# Patient Record
Sex: FEMALE | Race: BLACK OR AFRICAN AMERICAN | Employment: OTHER | URBAN - METROPOLITAN AREA
[De-identification: names, ages, dates, MRNs, and addresses within clinical notes are randomized per-mention and may not be internally consistent; named-entity substitution may affect disease eponyms.]

---

## 2018-09-18 LAB
AVERAGE GLUCOSE: NORMAL
HBA1C MFR BLD: 5.7 %

## 2020-01-07 LAB
AVERAGE GLUCOSE: NORMAL
HBA1C MFR BLD: 5.5 %

## 2022-04-08 PROBLEM — E66.9 OBESITY (BMI 30-39.9): Status: ACTIVE | Noted: 2022-04-08

## 2022-04-08 PROBLEM — R60.0 BILATERAL LOWER EXTREMITY EDEMA: Status: ACTIVE | Noted: 2022-04-08

## 2022-04-08 PROBLEM — Z00.00 HEALTHCARE MAINTENANCE: Status: ACTIVE | Noted: 2022-04-08

## 2022-04-11 ENCOUNTER — HOSPITAL ENCOUNTER (OUTPATIENT)
Dept: GENERAL RADIOLOGY | Age: 57
Discharge: HOME | End: 2022-04-11
Payer: COMMERCIAL

## 2022-04-11 DIAGNOSIS — M79.645 BILATERAL THUMB PAIN: ICD-10-CM

## 2022-04-11 DIAGNOSIS — M79.644 BILATERAL THUMB PAIN: ICD-10-CM

## 2022-04-11 PROBLEM — R60.0 BILATERAL LOWER EXTREMITY EDEMA: Status: ACTIVE | Noted: 2022-04-08

## 2022-04-11 PROBLEM — E66.9 OBESITY (BMI 30-39.9): Status: ACTIVE | Noted: 2022-04-08

## 2022-04-11 PROBLEM — Z00.00 HEALTHCARE MAINTENANCE: Status: ACTIVE | Noted: 2022-04-08

## 2022-04-11 PROCEDURE — 73140 X-RAY EXAM OF FINGER(S): CPT

## 2022-04-12 NOTE — PROGRESS NOTES
MEGHANM on 4/12/22 @ 8:18 AM to inform pt that x-ray of both thumbs is completely normal - no arthritis noted and to return my call with any questions/concerns.

## 2022-05-08 PROBLEM — Z00.00 HEALTHCARE MAINTENANCE: Status: RESOLVED | Noted: 2022-04-08 | Resolved: 2022-05-08

## 2022-05-13 ENCOUNTER — HOSPITAL ENCOUNTER (OUTPATIENT)
Dept: MAMMOGRAPHY | Age: 57
Discharge: HOME | End: 2022-05-13
Attending: PHYSICIAN ASSISTANT
Payer: COMMERCIAL

## 2022-05-13 DIAGNOSIS — Z12.31 ENCOUNTER FOR SCREENING MAMMOGRAM FOR BREAST CANCER: ICD-10-CM

## 2022-05-13 PROCEDURE — 77067 SCR MAMMO BI INCL CAD: CPT

## 2022-06-03 ENCOUNTER — HOSPITAL ENCOUNTER (OUTPATIENT)
Dept: MAMMOGRAPHY | Age: 57
Discharge: HOME | End: 2022-06-06
Payer: COMMERCIAL

## 2022-06-03 ENCOUNTER — APPOINTMENT (OUTPATIENT)
Dept: MAMMOGRAPHY | Age: 57
End: 2022-06-03
Payer: COMMERCIAL

## 2022-06-03 DIAGNOSIS — R92.8 ABNORMAL SCREENING MAMMOGRAM: ICD-10-CM

## 2022-06-03 PROCEDURE — 76642 ULTRASOUND BREAST LIMITED: CPT

## 2022-06-03 PROCEDURE — 77066 DX MAMMO INCL CAD BI: CPT

## 2022-06-06 NOTE — PROGRESS NOTES
I am just seeing this original mammogram report since it went to the old system. This was a new baseline since old imaging was not able to be reviewed. There were bilateral asymmetries which required further evaluation but I believe she has already had these done and I've reviewed the results.

## 2022-06-13 DIAGNOSIS — Z12.4 SCREENING FOR CERVICAL CANCER: Primary | ICD-10-CM

## 2022-06-13 NOTE — PROGRESS NOTES
Ordered by request of patient who desires a gynecologist to take care of pap smear which is overdue.

## 2022-08-12 DIAGNOSIS — E78.5 DYSLIPIDEMIA: ICD-10-CM

## 2022-08-12 DIAGNOSIS — I10 PRIMARY HYPERTENSION: Primary | ICD-10-CM

## 2022-08-24 DIAGNOSIS — E78.5 DYSLIPIDEMIA: ICD-10-CM

## 2022-08-24 DIAGNOSIS — I10 PRIMARY HYPERTENSION: ICD-10-CM

## 2022-08-25 LAB
ALBUMIN SERPL-MCNC: 3.5 G/DL (ref 3.5–5)
ALBUMIN/GLOB SERPL: 0.8 {RATIO} (ref 1.2–3.5)
ALP SERPL-CCNC: 89 U/L (ref 50–136)
ALT SERPL-CCNC: 16 U/L (ref 12–65)
ANION GAP SERPL CALC-SCNC: 8 MMOL/L (ref 7–16)
AST SERPL-CCNC: 20 U/L (ref 15–37)
BILIRUB SERPL-MCNC: 0.5 MG/DL (ref 0.2–1.1)
BUN SERPL-MCNC: 8 MG/DL (ref 6–23)
CALCIUM SERPL-MCNC: 10 MG/DL (ref 8.3–10.4)
CHLORIDE SERPL-SCNC: 111 MMOL/L (ref 98–107)
CHOLEST SERPL-MCNC: 184 MG/DL
CO2 SERPL-SCNC: 19 MMOL/L (ref 21–32)
CREAT SERPL-MCNC: 0.9 MG/DL (ref 0.6–1)
GLOBULIN SER CALC-MCNC: 4.4 G/DL (ref 2.3–3.5)
GLUCOSE SERPL-MCNC: 80 MG/DL (ref 65–100)
HDLC SERPL-MCNC: 53 MG/DL (ref 40–60)
HDLC SERPL: 3.5 {RATIO}
LDLC SERPL CALC-MCNC: 116.8 MG/DL
POTASSIUM SERPL-SCNC: 3.9 MMOL/L (ref 3.5–5.1)
PROT SERPL-MCNC: 7.9 G/DL (ref 6.3–8.2)
SODIUM SERPL-SCNC: 138 MMOL/L (ref 136–145)
TRIGL SERPL-MCNC: 71 MG/DL (ref 35–150)
VLDLC SERPL CALC-MCNC: 14.2 MG/DL (ref 6–23)

## 2022-08-26 ENCOUNTER — HOSPITAL ENCOUNTER (EMERGENCY)
Dept: CT IMAGING | Age: 57
Discharge: HOME | End: 2022-08-29
Payer: COMMERCIAL

## 2022-08-26 ENCOUNTER — HOSPITAL ENCOUNTER (EMERGENCY)
Dept: GENERAL RADIOLOGY | Age: 57
Discharge: HOME | End: 2022-08-29
Payer: COMMERCIAL

## 2022-08-26 ENCOUNTER — HOSPITAL ENCOUNTER (EMERGENCY)
Age: 57
Discharge: HOME | End: 2022-08-26
Attending: EMERGENCY MEDICINE
Payer: COMMERCIAL

## 2022-08-26 VITALS
SYSTOLIC BLOOD PRESSURE: 132 MMHG | BODY MASS INDEX: 34.99 KG/M2 | WEIGHT: 210 LBS | TEMPERATURE: 98.9 F | HEART RATE: 82 BPM | OXYGEN SATURATION: 99 % | DIASTOLIC BLOOD PRESSURE: 70 MMHG | RESPIRATION RATE: 18 BRPM | HEIGHT: 65 IN

## 2022-08-26 DIAGNOSIS — R10.84 GENERALIZED ABDOMINAL PAIN: Primary | ICD-10-CM

## 2022-08-26 DIAGNOSIS — R11.0 NAUSEA: ICD-10-CM

## 2022-08-26 LAB
ALBUMIN SERPL-MCNC: 3.4 G/DL (ref 3.5–5)
ALBUMIN/GLOB SERPL: 0.7 {RATIO} (ref 1.2–3.5)
ALP SERPL-CCNC: 99 U/L (ref 50–136)
ALT SERPL-CCNC: 12 U/L (ref 12–65)
ANION GAP SERPL CALC-SCNC: 6 MMOL/L (ref 7–16)
APPEARANCE UR: CLEAR
AST SERPL-CCNC: 20 U/L (ref 15–37)
BACTERIA URNS QL MICRO: NEGATIVE /HPF
BASOPHILS # BLD: 0.1 K/UL (ref 0–0.2)
BASOPHILS NFR BLD: 1 % (ref 0–2)
BILIRUB SERPL-MCNC: 0.6 MG/DL (ref 0.2–1.1)
BILIRUB UR QL: NEGATIVE
BUN SERPL-MCNC: 8 MG/DL (ref 6–23)
CALCIUM SERPL-MCNC: 9.4 MG/DL (ref 8.3–10.4)
CASTS URNS QL MICRO: ABNORMAL /LPF
CHLORIDE SERPL-SCNC: 111 MMOL/L (ref 98–107)
CO2 SERPL-SCNC: 25 MMOL/L (ref 21–32)
COLOR UR: ABNORMAL
CREAT SERPL-MCNC: 0.75 MG/DL (ref 0.6–1)
DIFFERENTIAL METHOD BLD: ABNORMAL
EKG ATRIAL RATE: 72 BPM
EKG DIAGNOSIS: NORMAL
EKG P AXIS: 44 DEGREES
EKG P-R INTERVAL: 180 MS
EKG Q-T INTERVAL: 376 MS
EKG QRS DURATION: 84 MS
EKG QTC CALCULATION (BAZETT): 411 MS
EKG R AXIS: 61 DEGREES
EKG T AXIS: 34 DEGREES
EKG VENTRICULAR RATE: 72 BPM
EOSINOPHIL # BLD: 0.1 K/UL (ref 0–0.8)
EOSINOPHIL NFR BLD: 1 % (ref 0.5–7.8)
EPI CELLS #/AREA URNS HPF: ABNORMAL /HPF
ERYTHROCYTE [DISTWIDTH] IN BLOOD BY AUTOMATED COUNT: 13.9 % (ref 11.9–14.6)
GLOBULIN SER CALC-MCNC: 4.6 G/DL (ref 2.3–3.5)
GLUCOSE SERPL-MCNC: 96 MG/DL (ref 65–100)
GLUCOSE UR STRIP.AUTO-MCNC: NEGATIVE MG/DL
HCT VFR BLD AUTO: 39.9 % (ref 35.8–46.3)
HGB BLD-MCNC: 12.4 G/DL (ref 11.7–15.4)
HGB UR QL STRIP: NEGATIVE
IMM GRANULOCYTES # BLD AUTO: 0 K/UL (ref 0–0.5)
IMM GRANULOCYTES NFR BLD AUTO: 0 % (ref 0–5)
KETONES UR QL STRIP.AUTO: NEGATIVE MG/DL
LEUKOCYTE ESTERASE UR QL STRIP.AUTO: ABNORMAL
LIPASE SERPL-CCNC: 85 U/L (ref 73–393)
LYMPHOCYTES # BLD: 3.5 K/UL (ref 0.5–4.6)
LYMPHOCYTES NFR BLD: 38 % (ref 13–44)
MAGNESIUM SERPL-MCNC: 2.3 MG/DL (ref 1.8–2.4)
MCH RBC QN AUTO: 29 PG (ref 26.1–32.9)
MCHC RBC AUTO-ENTMCNC: 31.1 G/DL (ref 31.4–35)
MCV RBC AUTO: 93.4 FL (ref 79.6–97.8)
MONOCYTES # BLD: 0.8 K/UL (ref 0.1–1.3)
MONOCYTES NFR BLD: 9 % (ref 4–12)
NEUTS SEG # BLD: 4.6 K/UL (ref 1.7–8.2)
NEUTS SEG NFR BLD: 51 % (ref 43–78)
NITRITE UR QL STRIP.AUTO: NEGATIVE
NRBC # BLD: 0 K/UL (ref 0–0.2)
NT PRO BNP: 31 PG/ML (ref 5–125)
PH UR STRIP: 5.5 [PH] (ref 5–9)
PLATELET # BLD AUTO: 312 K/UL (ref 150–450)
PMV BLD AUTO: 10.2 FL (ref 9.4–12.3)
POTASSIUM SERPL-SCNC: 4 MMOL/L (ref 3.5–5.1)
PROT SERPL-MCNC: 8 G/DL (ref 6.3–8.2)
PROT UR STRIP-MCNC: NEGATIVE MG/DL
RBC # BLD AUTO: 4.27 M/UL (ref 4.05–5.2)
RBC #/AREA URNS HPF: ABNORMAL /HPF
SODIUM SERPL-SCNC: 142 MMOL/L (ref 136–145)
SP GR UR REFRACTOMETRY: 1.01 (ref 1–1.02)
TROPONIN I SERPL HS-MCNC: 4.4 PG/ML (ref 0–14)
UROBILINOGEN UR QL STRIP.AUTO: 1 EU/DL (ref 0.2–1)
WBC # BLD AUTO: 9 K/UL (ref 4.3–11.1)
WBC URNS QL MICRO: ABNORMAL /HPF

## 2022-08-26 PROCEDURE — 93005 ELECTROCARDIOGRAM TRACING: CPT | Performed by: EMERGENCY MEDICINE

## 2022-08-26 PROCEDURE — 85025 COMPLETE CBC W/AUTO DIFF WBC: CPT

## 2022-08-26 PROCEDURE — 80053 COMPREHEN METABOLIC PANEL: CPT

## 2022-08-26 PROCEDURE — 99285 EMERGENCY DEPT VISIT HI MDM: CPT

## 2022-08-26 PROCEDURE — 84484 ASSAY OF TROPONIN QUANT: CPT

## 2022-08-26 PROCEDURE — 83690 ASSAY OF LIPASE: CPT

## 2022-08-26 PROCEDURE — 81001 URINALYSIS AUTO W/SCOPE: CPT

## 2022-08-26 PROCEDURE — 83880 ASSAY OF NATRIURETIC PEPTIDE: CPT

## 2022-08-26 PROCEDURE — 71045 X-RAY EXAM CHEST 1 VIEW: CPT

## 2022-08-26 PROCEDURE — 74177 CT ABD & PELVIS W/CONTRAST: CPT

## 2022-08-26 PROCEDURE — 83735 ASSAY OF MAGNESIUM: CPT

## 2022-08-26 PROCEDURE — 6360000004 HC RX CONTRAST MEDICATION: Performed by: EMERGENCY MEDICINE

## 2022-08-26 RX ORDER — ONDANSETRON 4 MG/1
4 TABLET, FILM COATED ORAL 3 TIMES DAILY PRN
Qty: 15 TABLET | Refills: 0 | Status: SHIPPED | OUTPATIENT
Start: 2022-08-26

## 2022-08-26 RX ORDER — ATORVASTATIN CALCIUM 40 MG/1
40 TABLET, FILM COATED ORAL DAILY
COMMUNITY
Start: 2022-04-14

## 2022-08-26 RX ORDER — HYOSCYAMINE SULFATE 0.12 MG/1
0.12 TABLET SUBLINGUAL EVERY 6 HOURS PRN
Qty: 20 EACH | Refills: 0 | Status: SHIPPED | OUTPATIENT
Start: 2022-08-26

## 2022-08-26 RX ORDER — CEPHALEXIN 500 MG/1
500 CAPSULE ORAL 3 TIMES DAILY
Qty: 21 CAPSULE | Refills: 0 | Status: SHIPPED | OUTPATIENT
Start: 2022-08-26 | End: 2022-09-02

## 2022-08-26 RX ORDER — HYDROCHLOROTHIAZIDE 25 MG/1
25 TABLET ORAL DAILY
COMMUNITY
Start: 2022-04-11

## 2022-08-26 RX ORDER — ONDANSETRON 2 MG/ML
4 INJECTION INTRAMUSCULAR; INTRAVENOUS ONCE
Status: DISCONTINUED | OUTPATIENT
Start: 2022-08-26 | End: 2022-08-26 | Stop reason: HOSPADM

## 2022-08-26 RX ADMIN — IOPAMIDOL 100 ML: 755 INJECTION, SOLUTION INTRAVENOUS at 16:04

## 2022-08-26 NOTE — ED NOTES
Assumed care of pt. After report from  [facility].      Luz Elena Covarrubias RN  08/26/22 2176

## 2022-08-26 NOTE — ED TRIAGE NOTES
Pt states that for the past month she's been feeling chest pressure, generalized abdominal pain, and nausea. Pt denies vomiting.

## 2022-08-26 NOTE — PROGRESS NOTES
I taken over the patient at 1600 pending CT scan the patient's abdomen. I went interviewed and examined the patient. She states that she has been having some generalized abdominal pain as well as some lower abdominal pain. Lab work here was reviewed showed no elevation white blood cell count, CMP here is felt unremarkable with normal lipase and liver and bilirubin. Urinalysis did show some slight leukocyte esterase. This time the patient be given Levsin and Zofran as well as Keflex at home. She will be given outpatient surgeon follow-up. Patient was given return precautions. Patient stable and discharged abdominal examination.

## 2022-08-26 NOTE — ED NOTES
I have reviewed discharge instructions with the patient. The patient verbalized understanding. Patient left ED via Discharge Method: ambulatory to Home with Self). Opportunity for questions and clarification provided. Patient given 3 scripts. To continue your aftercare when you leave the hospital, you may receive an automated call from our care team to check in on how you are doing. This is a free service and part of our promise to provide the best care and service to meet your aftercare needs.  If you have questions, or wish to unsubscribe from this service please call [phone]. Thank you for Choosing our [facility] Emergency Department.       Nancy Ndiaye RN  08/26/22 4743

## 2022-08-26 NOTE — ED PROVIDER NOTES
[employer] Emergency Department Provider Note                   PCP:                Kierra Borges PA-C               Age: 65 y.o. Sex: female     No diagnosis found. DISPOSITION         MDM  Number of Diagnoses or Management Options  Diagnosis management comments: Patient labs have been normal she has been in no distress while in the ED. She is still waiting on her CT scan which have called several times about to make sure gets expedited. Unfortunately my shift is over and I had to sign patient out to oncoming ED doctor. She will be disposed after this results. Xiang Lezama MD; 8/26/2022 @4:03 PM Voice dictation software was used during the making of this note. This software is not perfect and grammatical and other typographical errors may be present. This note has not been proofread for errors.  ====================================          Amount and/or Complexity of Data Reviewed  Clinical lab tests: ordered and reviewed  Tests in the radiology section of CPT®: ordered and reviewed         Orders Placed This Encounter   Procedures    XR CHEST PORTABLE    CT ABDOMEN PELVIS W IV CONTRAST Additional Contrast? None    CBC with Auto Differential    Comprehensive Metabolic Panel    Magnesium    Lipase    Brain Natriuretic Peptide    Troponin    Cardiac Monitor - ED Only    Continuous Pulse Oximetry    EKG 12 Lead    Saline lock IV        Medications - No data to display    New Prescriptions    No medications on file        Ricardo Shah is a 65 y.o. female who presents to the Emergency Department with chief complaint of    Chief Complaint   Patient presents with    Abdominal Pain      Patient has had some off-and-on abdominal pain for the last month. She reports that over the last day she has developed more severe abdominal pain mostly along the lower abdomen but there is some underneath both ribs bilaterally and some pain in the lower back.   She denies any history of past surgeries on the abdomen. No fevers or chills. She has no vomiting. But does report nausea. She did report some pressure radiating up into her chest and reports that she feels swollen from fluids all over her body. States she does take hydrochlorothiazide but forgot to take it this morning. All other systems reviewed and are negative unless otherwise stated in the History of Present Illness section. Past Medical History:   Diagnosis Date    Asthma     Edema     Environmental allergies     Positive for Dust Mites, Molds, Grass & Tree Pollens    Hypercholesterolemia     Hypertension     Injury of left rotator cuff     Menopause     Osteoarthritis     Knees    Prediabetes         Past Surgical History:   Procedure Laterality Date    BREAST BIOPSY Left     Benign    COLONOSCOPY  11/05/2018    Hyperplastic Polyps - Due 2028        Family History   Problem Relation Age of Onset    Diabetes Father     Hypertension Father     Stroke Maternal Grandfather     No Known Problems Brother     Alzheimer's Disease Maternal Grandmother     Diabetes Mother     Hypertension Mother     Alzheimer's Disease Mother         Social History     Socioeconomic History    Marital status: Legally      Spouse name: None    Number of children: None    Years of education: None    Highest education level: None   Tobacco Use    Smoking status: Never    Smokeless tobacco: Never   Substance and Sexual Activity    Alcohol use: Never    Drug use: Never        Allergies: Benzalkonium chloride and Sulfa antibiotics    Previous Medications    ATORVASTATIN (LIPITOR) 40 MG TABLET    Take 40 mg by mouth daily    CYANOCOBALAMIN 1000 MCG TABLET    Take 1,000 mcg by mouth daily    HYDROCHLOROTHIAZIDE (HYDRODIURIL) 25 MG TABLET    Take 25 mg by mouth daily        Vitals signs and nursing note reviewed.    ED Triage Vitals [08/26/22 1241]   Enc Vitals Group      /82      Heart Rate 99      Resp 18      Temp 98.9 °F (37.2 °C)      Temp Source Oral SpO2 99 %      Weight 210 lb (95.3 kg)      Height 5' 4.5\" (1.638 m)      Head Circumference       Peak Flow       Pain Score       Pain Loc       Pain Edu? Excl. in [address]? Physical Exam  Vitals and nursing note reviewed. Constitutional:       General: She is not in acute distress. Appearance: She is well-developed. She is not ill-appearing, toxic-appearing or diaphoretic. HENT:      Head: Normocephalic and atraumatic. Eyes:      General: No scleral icterus. Conjunctiva/sclera: Conjunctivae normal.   Cardiovascular:      Rate and Rhythm: Normal rate and regular rhythm. Pulmonary:      Effort: Pulmonary effort is normal. No respiratory distress. Breath sounds: No stridor. No wheezing, rhonchi or rales. Abdominal:      General: Bowel sounds are normal.      Palpations: Abdomen is soft. There is no shifting dullness or fluid wave. Tenderness: There is generalized abdominal tenderness and tenderness in the right upper quadrant, right lower quadrant, left upper quadrant and left lower quadrant. There is no right CVA tenderness, left CVA tenderness, guarding or rebound. Hernia: No hernia is present. Musculoskeletal:      Cervical back: Normal range of motion and neck supple. Skin:     Capillary Refill: Capillary refill takes less than 2 seconds. Neurological:      General: No focal deficit present. Mental Status: She is alert and oriented to person, place, and time. Mental status is at baseline. Psychiatric:         Mood and Affect: Mood normal.         Behavior: Behavior normal.        Procedures    Labs Reviewed   CBC WITH AUTO DIFFERENTIAL   COMPREHENSIVE METABOLIC PANEL   MAGNESIUM   LIPASE   BRAIN NATRIURETIC PEPTIDE   TROPONIN        XR CHEST PORTABLE    (Results Pending)   CT ABDOMEN PELVIS W IV CONTRAST Additional Contrast? None    (Results Pending)                            Voice dictation software was used during the making of this note.   This software is not perfect and grammatical and other typographical errors may be present. This note has not been completely proofread for errors.      Samia Escudero MD  08/26/22 9430

## 2022-08-26 NOTE — DISCHARGE INSTRUCTIONS
You are diagnosed with unspecified abdominal pain here as well as urinary tract infection. There is evidence of gallstones on your CAT scan. Will be follow-up with the surgeon for continued evaluation of your gallstones. We have written you for 3 medications at home. These were sent to the [facility] on [facility]. Please return the emergency room if any worsening symptoms.

## 2022-08-29 ENCOUNTER — NURSE ONLY (OUTPATIENT)
Dept: INTERNAL MEDICINE CLINIC | Facility: CLINIC | Age: 57
End: 2022-08-29

## 2022-08-29 ENCOUNTER — OFFICE VISIT (OUTPATIENT)
Dept: INTERNAL MEDICINE CLINIC | Facility: CLINIC | Age: 57
End: 2022-08-29
Payer: COMMERCIAL

## 2022-08-29 VITALS
HEART RATE: 75 BPM | HEIGHT: 65 IN | TEMPERATURE: 97.9 F | BODY MASS INDEX: 35.16 KG/M2 | OXYGEN SATURATION: 98 % | WEIGHT: 211 LBS | SYSTOLIC BLOOD PRESSURE: 115 MMHG | DIASTOLIC BLOOD PRESSURE: 75 MMHG

## 2022-08-29 DIAGNOSIS — E78.5 DYSLIPIDEMIA: ICD-10-CM

## 2022-08-29 DIAGNOSIS — K59.00 CONSTIPATION, UNSPECIFIED CONSTIPATION TYPE: ICD-10-CM

## 2022-08-29 DIAGNOSIS — R74.8 ELEVATED ALKALINE PHOSPHATASE LEVEL: ICD-10-CM

## 2022-08-29 DIAGNOSIS — K64.9 HEMORRHOIDS, UNSPECIFIED HEMORRHOID TYPE: ICD-10-CM

## 2022-08-29 DIAGNOSIS — M53.3 SCLEROSIS OF SACROILIAC JOINT: Primary | ICD-10-CM

## 2022-08-29 DIAGNOSIS — R93.5 ABNORMAL CT SCAN, PELVIS: ICD-10-CM

## 2022-08-29 DIAGNOSIS — I10 PRIMARY HYPERTENSION: ICD-10-CM

## 2022-08-29 DIAGNOSIS — M53.3 SCLEROSIS OF SACROILIAC JOINT: ICD-10-CM

## 2022-08-29 DIAGNOSIS — R10.84 GENERALIZED ABDOMINAL PAIN: ICD-10-CM

## 2022-08-29 PROCEDURE — 99215 OFFICE O/P EST HI 40 MIN: CPT | Performed by: PHYSICIAN ASSISTANT

## 2022-08-29 RX ORDER — EVENING PRIMROSE OIL 500 MG
CAPSULE ORAL DAILY
COMMUNITY

## 2022-08-29 RX ORDER — DIAPER,BRIEF,INFANT-TODD,DISP
EACH MISCELLANEOUS
Qty: 30 G | Refills: 1 | Status: SHIPPED | OUTPATIENT
Start: 2022-08-29 | End: 2022-09-05

## 2022-08-29 ASSESSMENT — PATIENT HEALTH QUESTIONNAIRE - PHQ9
1. LITTLE INTEREST OR PLEASURE IN DOING THINGS: 0
2. FEELING DOWN, DEPRESSED OR HOPELESS: 0
SUM OF ALL RESPONSES TO PHQ QUESTIONS 1-9: 0
SUM OF ALL RESPONSES TO PHQ9 QUESTIONS 1 & 2: 0
SUM OF ALL RESPONSES TO PHQ QUESTIONS 1-9: 0

## 2022-08-29 ASSESSMENT — ENCOUNTER SYMPTOMS
VOMITING: 0
NAUSEA: 1
BLOOD IN STOOL: 0
BACK PAIN: 1
CONSTIPATION: 1
ABDOMINAL PAIN: 1
ABDOMINAL DISTENTION: 1
SHORTNESS OF BREATH: 1

## 2022-08-29 NOTE — PATIENT INSTRUCTIONS
Trial hold of Lipitor x 2 weeks  Emphasized the importance of staying well hydrated with plenty of water especially since she's back on a daily diuretic (Hydrochlorothiazide)  Reviewed findings on CT scan of pelvis and discussed further work-up to be necessary to evaluate further for possibility of Paget's disease or other source of bone abnormalities noted on CT  Reassured that pocket of fluid on front of R knee is likely a bursitis reaction to her fall on that knee  Offered x-ray of R ankle joint since she does have some discomfort there but she declined for now opting to focus on the other testing we have planned  Suggest following a low sodium diet to limit the retention of fluid by her body  Advised that she will need to get fasting labs drawn at any of the [address] [place] [address] lab locations (outlined on handout provided to patient) approx 1 week prior to scheduled follow-up appointment

## 2022-08-29 NOTE — PROGRESS NOTES
Ramón Robles (:  1965) is a 65 y.o. female,Established patient, here for evaluation of the following chief complaint(s):  Follow-up (Hypertension, Hyperlipidemia) and Abdominal Pain (Recent ER visit 22)         ASSESSMENT/PLAN:  1. Sclerosis of sacroiliac joint  -     Alkaline Phosphatase, Bone Specific; Future  -     Phosphorus; Future  -     NM BONE SCAN WHOLE BODY; Future  -     C Telopeptide; Future  -     Procollagen I Intact N-TErminal PROPEP; Future  -     [facility] Home   2. Hemorrhoids, unspecified hemorrhoid type  -     hydrocortisone (ALA-BRUCE) 1 % cream; Apply topically 2 times daily. , Disp-30 g, R-1, Normal  3. Dyslipidemia  -     Lipid Panel; Future  4. Primary hypertension  -     Comprehensive Metabolic Panel; Future  -     CBC with Auto Differential; Future  5. Elevated alkaline phosphatase level  6. Abnormal CT scan, pelvis  7. Constipation, unspecified constipation type  8. Generalized abdominal pain    No follow-ups on file. Subjective   SUBJECTIVE/OBJECTIVE:  The patient is a 65 y.o. female who is seen for evaluation of hyperlipidemia. She was tested because of dyslipidemia. The current state of this condition is improved and reasonably well controlled on Lipitor 40 mg q hs - taking as prescribed, adverse effects: constipation & abdominal pain.        Last Lipid Panel:   Lab Results   Component Value Date    CHOL 184 2022    CHOL 245 (H) 2022     Lab Results   Component Value Date    TRIG 71 2022    TRIG 62 2022     Lab Results   Component Value Date    HDL 53 2022    HDL 52 2022     Lab Results   Component Value Date    LDLCALC 116.8 (H) 2022    LDLCALC 183 (H) 2022     Lab Results   Component Value Date    LABVLDL 14.2 2022    VLDL 10 2022     Lab Results   Component Value Date    CHOLHDLRATIO 3.5 2022       Patient is here for ER follow-up of generalized abdominal pain w/ nausea. The current state of this condition is poorly controlled on Miralax sporadically - not taking daily as prescribed. She describes coming back from recent vacation earlier this month with severe constipation requiring manual disimpaction with intermittent abdominal pain x 1 month. She reports painful & irritated hemorrhoids as a result of the constipation. She admits to not drinking enough water - especially on recent vacation and has been back on Lipitor x 4 months which has historically caused constipation for her. Work up at the ER included extensive labs including CBC, CMP, magnesium, lipase, BNP, troponin, & urinalysis which were all within normal limits & CXR which was normal, & CT abdomen & pelvis which found \"diffuse sclerosis with cortical and trabecular thickening involving the R hemipelvis - favored to represent sequela of Paget's disease, and cholelithiasis without evidence of acute cholecystitis\". She does recall a history of mildly elevated alkaline phosphatase on previous labs - 127 in Jan 2020 & 130 in Feb 2020 per review of historical lab results.       Recent Results (from the past 336 hour(s))   Lipid Panel    Collection Time: 08/24/22  9:37 AM   Result Value Ref Range    Cholesterol, Total 184 <200 MG/DL    Triglycerides 71 35 - 150 MG/DL    HDL 53 40 - 60 MG/DL    LDL Calculated 116.8 (H) <100 MG/DL    VLDL Cholesterol Calculated 14.2 6.0 - 23.0 MG/DL    Chol/HDL Ratio 3.5     Comprehensive Metabolic Panel    Collection Time: 08/24/22  9:37 AM   Result Value Ref Range    Sodium 138 136 - 145 mmol/L    Potassium 3.9 3.5 - 5.1 mmol/L    Chloride 111 (H) 98 - 107 mmol/L    CO2 19 (L) 21 - 32 mmol/L    Anion Gap 8 7 - 16 mmol/L    Glucose 80 65 - 100 mg/dL    BUN 8 6 - 23 MG/DL    Creatinine 0.90 0.6 - 1.0 MG/DL    GFR African American >60 >60 ml/min/1.73m2    GFR Non- >60 >60 ml/min/1.73m2    Calcium 10.0 8.3 - 10.4 MG/DL    Total Bilirubin 0.5 0.2 - 1.1 MG/DL    ALT 16 12 - 65 U/L    AST 20 15 - 37 U/L    Alk Phosphatase 89 50 - 136 U/L    Total Protein 7.9 6.3 - 8.2 g/dL    Albumin 3.5 3.5 - 5.0 g/dL    Globulin 4.4 (H) 2.3 - 3.5 g/dL    Albumin/Globulin Ratio 0.8 (L) 1.2 - 3.5     CBC with Auto Differential    Collection Time: 08/26/22  1:39 PM   Result Value Ref Range    WBC 9.0 4.3 - 11.1 K/uL    RBC 4.27 4.05 - 5.2 M/uL    Hemoglobin 12.4 11.7 - 15.4 g/dL    Hematocrit 39.9 35.8 - 46.3 %    MCV 93.4 79.6 - 97.8 FL    MCH 29.0 26.1 - 32.9 PG    MCHC 31.1 (L) 31.4 - 35.0 g/dL    RDW 13.9 11.9 - 14.6 %    Platelets 771 073 - 728 K/uL    MPV 10.2 9.4 - 12.3 FL    nRBC 0.00 0.0 - 0.2 K/uL    Differential Type AUTOMATED      Seg Neutrophils 51 43 - 78 %    Lymphocytes 38 13 - 44 %    Monocytes 9 4.0 - 12.0 %    Eosinophils % 1 0.5 - 7.8 %    Basophils 1 0.0 - 2.0 %    Immature Granulocytes 0 0.0 - 5.0 %    Segs Absolute 4.6 1.7 - 8.2 K/UL    Absolute Lymph # 3.5 0.5 - 4.6 K/UL    Absolute Mono # 0.8 0.1 - 1.3 K/UL    Absolute Eos # 0.1 0.0 - 0.8 K/UL    Basophils Absolute 0.1 0.0 - 0.2 K/UL    Absolute Immature Granulocyte 0.0 0.0 - 0.5 K/UL   Comprehensive Metabolic Panel    Collection Time: 08/26/22  1:39 PM   Result Value Ref Range    Sodium 142 136 - 145 mmol/L    Potassium 4.0 3.5 - 5.1 mmol/L    Chloride 111 (H) 98 - 107 mmol/L    CO2 25 21 - 32 mmol/L    Anion Gap 6 (L) 7 - 16 mmol/L    Glucose 96 65 - 100 mg/dL    BUN 8 6 - 23 MG/DL    Creatinine 0.75 0.6 - 1.0 MG/DL    GFR African American >60 >60 ml/min/1.73m2    GFR Non- >60 >60 ml/min/1.73m2    Calcium 9.4 8.3 - 10.4 MG/DL    Total Bilirubin 0.6 0.2 - 1.1 MG/DL    ALT 12 12 - 65 U/L    AST 20 15 - 37 U/L    Alk Phosphatase 99 50 - 136 U/L    Total Protein 8.0 6.3 - 8.2 g/dL    Albumin 3.4 (L) 3.5 - 5.0 g/dL    Globulin 4.6 (H) 2.3 - 3.5 g/dL    Albumin/Globulin Ratio 0.7 (L) 1.2 - 3.5     Magnesium    Collection Time: 08/26/22  1:39 PM   Result Value Ref Range    Magnesium 2.3 1.8 - 2.4 patient size, iterative reconstruction. FINDINGS:  LUNG BASES: No airspace consolidation within the lung bases. No sizable pleural  effusion. The heart is not enlarged. LIVER: The liver contour is normal. No suspicious liver lesion. BILIARY TREE: Punctate calcified gallstone within the gallbladder. No evidence  of acute cholecystitis. No biliary dilation. SPLEEN: Normal.    PANCREAS: No pancreatic mass or ductal dilation. ADRENALS: Normal.    KIDNEYS/BLADDER: The kidneys are symmetric in size. No renal calculus or  hydronephrosis. Few subcentimeter bilateral renal cysts. No enhancing renal  masses evident. The urinary bladder is unremarkable. BOWEL: The colon is unremarkable. The small bowel is normal in caliber. No bowel  wall thickening. APPENDIX: The appendix is normal.    PERITONEUM/RETROPERITONEUM: No ascites or free air. No pelvic or retroperitoneal  lymphadenopathy. VESSELS: No abdominal aortic aneurysm. ABDOMINAL WALL: No hernia or mass. REPRODUCTIVE: The uterus is unremarkable. No adnexal mass. Sclerosis involving  the right hemipelvis with associated cortical and trabecular thickening. BONES: The remaining visualized osseous structures are within normal limits. Minimal degenerative changes of the lumbar spine. Impression  1. No acute abnormality within the abdomen and pelvis. 2.  Diffuse sclerosis with cortical and trabecular thickening involving the  right hemipelvis, favored to represent sequela of Paget's disease. 3.  Cholelithiasis without evidence of acute cholecystitis. Patient is here for follow-up of dizziness. The current state of this condition is asymptomatic and improved - not currently on medications for this problem. Home monitoring of O2 levels has been 96-97% but she denies any dizziness episodes since last visit so has not been able to evaluate oxygen levels DURING or immediately following an episode.       Review of Systems   Constitutional: Positive for appetite change (reduced x 1 year), fever (intermittent x 1 month) and unexpected weight change (30 pounds). Negative for chills and fatigue. Respiratory:  Positive for shortness of breath. Cardiovascular:  Positive for chest pain (described as pressure/fullness - not found to be cardiac at ER) and leg swelling (R leg). Negative for palpitations. Gastrointestinal:  Positive for abdominal distention, abdominal pain (consistent lower quadrants x 1 month), constipation (x 10 days) and nausea. Negative for blood in stool and vomiting. Positive for indigestion   Musculoskeletal:  Positive for back pain (intermittent - lumbar chronic) and myalgias (bilateral knees). Neurological:  Positive for headaches. Negative for dizziness. Objective   Physical Exam  Constitutional:       Appearance: Normal appearance. HENT:      Head: Normocephalic and atraumatic. Eyes:      Conjunctiva/sclera: Conjunctivae normal.      Pupils: Pupils are equal, round, and reactive to light. Neck:      Vascular: No carotid bruit. Cardiovascular:      Rate and Rhythm: Normal rate and regular rhythm. Heart sounds: Normal heart sounds. Pulmonary:      Effort: Pulmonary effort is normal.      Breath sounds: Normal breath sounds. Abdominal:      General: Bowel sounds are normal.      Palpations: Abdomen is soft. Tenderness: There is generalized abdominal tenderness. Musculoskeletal:         General: Normal range of motion. Cervical back: Normal range of motion. Right knee: Swelling (prepatellar) present. Right lower leg: Swelling present. No edema. Left lower leg: No swelling. No edema. Right ankle: Swelling present. Legs:       Comments: Scar from fall   Skin:     General: Skin is warm and dry. Neurological:      Mental Status: She is alert and oriented to person, place, and time.    Psychiatric:         Mood and Affect: Mood normal.         Behavior: Behavior normal.         Thought Content: Thought content normal.         Judgment: Judgment normal.          On this date 8/29/2022 I have spent 45 minutes reviewing previous notes, test results and face to face with the patient discussing the diagnosis and importance of compliance with the treatment plan as well as documenting on the day of the visit. An electronic signature was used to authenticate this note.     --Qi Dodd PA-C

## 2022-09-01 LAB — PHOSPHATE SERPL-MCNC: 3.8 MG/DL (ref 2.5–4.5)

## 2022-09-08 LAB — COLLAGEN CTX SERPL-MCNC: 551 PG/ML

## 2022-09-09 LAB
ALP BONE SERPL-MCNC: 32.7 UG/L
PINP SER-MCNC: 184 UG/L

## 2022-10-12 ENCOUNTER — APPOINTMENT (OUTPATIENT)
Dept: CT IMAGING | Age: 57
End: 2022-10-12
Payer: COMMERCIAL

## 2022-10-12 ENCOUNTER — HOSPITAL ENCOUNTER (EMERGENCY)
Age: 57
Discharge: HOME | End: 2022-10-12
Attending: EMERGENCY MEDICINE | Admitting: EMERGENCY MEDICINE
Payer: COMMERCIAL

## 2022-10-12 VITALS
RESPIRATION RATE: 18 BRPM | OXYGEN SATURATION: 99 % | HEART RATE: 82 BPM | DIASTOLIC BLOOD PRESSURE: 88 MMHG | TEMPERATURE: 98.8 F | SYSTOLIC BLOOD PRESSURE: 156 MMHG

## 2022-10-12 DIAGNOSIS — R11.0 NAUSEA: ICD-10-CM

## 2022-10-12 DIAGNOSIS — F07.81 CONCUSSION SYNDROME: ICD-10-CM

## 2022-10-12 DIAGNOSIS — S09.90XA INJURY OF HEAD, INITIAL ENCOUNTER: Primary | ICD-10-CM

## 2022-10-12 PROCEDURE — 70450 CT HEAD/BRAIN W/O DYE: CPT

## 2022-10-12 PROCEDURE — 99284 EMERGENCY DEPT VISIT MOD MDM: CPT | Performed by: EMERGENCY MEDICINE

## 2022-10-12 PROCEDURE — 72125 CT NECK SPINE W/O DYE: CPT

## 2022-10-12 RX ORDER — ONDANSETRON 4 MG/1
4 TABLET, FILM COATED ORAL 3 TIMES DAILY PRN
Qty: 15 TABLET | Refills: 2 | Status: SHIPPED | OUTPATIENT
Start: 2022-10-12

## 2022-10-12 ASSESSMENT — PAIN DESCRIPTION - LOCATION: LOCATION: HEAD

## 2022-10-12 ASSESSMENT — PAIN DESCRIPTION - ORIENTATION: ORIENTATION: POSTERIOR

## 2022-10-12 ASSESSMENT — ENCOUNTER SYMPTOMS: BACK PAIN: 0

## 2022-10-12 ASSESSMENT — PAIN - FUNCTIONAL ASSESSMENT: PAIN_FUNCTIONAL_ASSESSMENT: 0-10

## 2022-10-12 ASSESSMENT — PAIN SCALES - GENERAL: PAINLEVEL_OUTOF10: 7

## 2022-10-12 NOTE — ED TRIAGE NOTES
Pt ambulatory to triage. Pt states that she got up around 0300 last night to use the restroom and tripped and fell, hit her head on the tub. Pt did not have any LOC from this fall. Pt states a few hours ago she started to have a posterior headache along with some nausea and slight neck pain. Pt denies weakness or numbness/tingling on one side of the body, double vision, denies issues with speech, pt able to walk in a straight line with a normal gait. NIH assessed in triage, NIH 0 at this time. Pt denies blood thinners.

## 2022-10-12 NOTE — ED PROVIDER NOTES
[facility] Emergency Department Provider Note                   PCP:                Mariza Manuel PA-C               Age: 65 y.o. Sex: female       ICD-10-CM    1. Injury of head, initial encounter  S09.90XA       2. Nausea  R11.0       3. Concussion syndrome  F07.81           DISPOSITION Decision To Discharge 10/12/2022 03:04:37 PM         Orders Placed This Encounter   Procedures    CT HEAD WO CONTRAST    CT CERVICAL SPINE WO CONTRAST        Yoav Mcnair is a 65 y.o. female who presents to the Emergency Department with chief complaint of    Chief Complaint   Patient presents with    Fall      52-year-old female who had a fall overnight, early this morning. This was a mechanical fall. She tripped on the bathroom. She fell forward hitting the top of her head on the bathtub. She did not lose consciousness. She has had a mild headache and developed some nausea however has not had any vomiting. She has no other residual symptoms. Denies any visual changes, double vision, blurred vision, chest pain, dizziness, fever or chills. The history is provided by the patient. No  was used. Review of Systems   Constitutional:  Negative for chills and fever. Musculoskeletal:  Negative for arthralgias, back pain and gait problem. Neurological:  Positive for dizziness and headaches. All other systems reviewed and are negative.     Past Medical History:   Diagnosis Date    Asthma     Edema     Environmental allergies     Positive for Dust Mites, Molds, Grass & Tree Pollens    Hypercholesterolemia     Hypertension     Injury of left rotator cuff     Menopause     Osteoarthritis     Knees    Prediabetes         Past Surgical History:   Procedure Laterality Date    BREAST BIOPSY Left     Benign    COLONOSCOPY  11/05/2018    Hyperplastic Polyps - Due 2028        Family History   Problem Relation Age of Onset    Diabetes Father     Hypertension Father     Stroke Maternal Grandfather No Known Problems Brother     Alzheimer's Disease Maternal Grandmother     Diabetes Mother     Hypertension Mother     Alzheimer's Disease Mother         Social History     Socioeconomic History    Marital status: Legally    Tobacco Use    Smoking status: Never    Smokeless tobacco: Never   Vaping Use    Vaping Use: Never used   Substance and Sexual Activity    Alcohol use: Never    Drug use: Never         Benzalkonium chloride and Sulfa antibiotics     Previous Medications    ATORVASTATIN (LIPITOR) 40 MG TABLET    Take 40 mg by mouth daily    CYANOCOBALAMIN 1000 MCG TABLET    Take 1,000 mcg by mouth daily    HYDROCHLOROTHIAZIDE (HYDRODIURIL) 25 MG TABLET    Take 25 mg by mouth daily    HYOSCYAMINE SULFATE SL (LEVSIN/SL) 0.125 MG SUBL    Place 0.125 mg under the tongue every 6 hours as needed (abdomiaal spasms)    MULTIPLE VITAMIN (MULTIVITAMIN ADULT PO)    Take 1 tablet by mouth daily    ONDANSETRON (ZOFRAN) 4 MG TABLET    Take 1 tablet by mouth 3 times daily as needed for Nausea or Vomiting    VITAMIN E 45 MG (100 UNIT) CAPSULE    Take by mouth daily        Vitals signs and nursing note reviewed. Patient Vitals for the past 4 hrs:   Temp Pulse Resp BP SpO2   10/12/22 1408 98.8 °F (37.1 °C) 82 18 (!) 156/88 99 %          Physical Exam  Vitals and nursing note reviewed. Constitutional:       General: She is not in acute distress. Appearance: Normal appearance. She is normal weight. She is not ill-appearing, toxic-appearing or diaphoretic. HENT:      Head: Normocephalic and atraumatic. Nose: No congestion. Mouth/Throat:      Mouth: Mucous membranes are moist.   Eyes:      Pupils: Pupils are equal, round, and reactive to light. Cardiovascular:      Rate and Rhythm: Normal rate. Pulmonary:      Effort: Pulmonary effort is normal.   Abdominal:      General: Abdomen is flat. Palpations: Abdomen is soft. Skin:     General: Skin is warm and dry.    Neurological:      General: No focal deficit present. Mental Status: She is alert. Psychiatric:         Mood and Affect: Mood normal.        MDM  Number of Diagnoses or Management Options  Diagnosis management comments: She has an unremarkable physical exam with no physical outward findings of her fall this morning. Head CT and CT C-spine unremarkable for any acute abnormality. Will do symptomatic treatment at home. Brain rest.  Zofran for symptomatic nausea. Amount and/or Complexity of Data Reviewed  Tests in the radiology section of CPT®: ordered and reviewed  Tests in the medicine section of CPT®: ordered and reviewed    Risk of Complications, Morbidity, and/or Mortality  Presenting problems: moderate  Diagnostic procedures: moderate  Management options: moderate        Procedures      Labs Reviewed - No data to display     CT HEAD WO CONTRAST   Final Result   No acute intracranial abnormality. CT CERVICAL SPINE WO CONTRAST   Final Result   No acute traumatic abnormality of the cervical spine. NIH Stroke Scale  Interval: Baseline  Level of Consciousness (1a): Alert  LOC Questions (1b): Answers both correctly  LOC Commands (1c): Performs both tasks correctly  Best Gaze (2): Normal  Visual (3): No visual loss  Facial Palsy (4): Normal symmetrical movement  Motor Arm, Left (5a): No drift  Motor Arm, Right (5b): No drift  Motor Leg, Left (6a): No drift  Motor Leg, Right (6b): No drift  Limb Ataxia (7): Absent  Sensory (8): Normal  Best Language (9): No aphasia  Dysarthria (10): Normal  Extinction and Inattention (11): No abnormality  Total: 0                 Voice dictation software was used during the making of this note. This software is not perfect and grammatical and other typographical errors may be present. This note has not been completely proofread for errors.      MARAH Corbin  10/12/22 [id number]

## 2022-10-27 ENCOUNTER — TELEPHONE (OUTPATIENT)
Dept: INTERNAL MEDICINE CLINIC | Facility: CLINIC | Age: 57
End: 2022-10-27

## 2022-10-27 NOTE — TELEPHONE ENCOUNTER
Patient called stating she was informed that she is allergic to tobacco and wants to know if Lowell Matamoros can call in an epipen for her.

## 2022-10-27 NOTE — TELEPHONE ENCOUNTER
I would need to see her - we can discuss during her next appointment in December or she can request the prescription from the provider who told her that. Also would need documentation from whomever suggested her being allergic to tobacco if we are going to prescribe an Epipen.

## 2022-11-21 DIAGNOSIS — E78.5 HYPERLIPIDEMIA, UNSPECIFIED: ICD-10-CM

## 2022-11-21 DIAGNOSIS — I10 ESSENTIAL (PRIMARY) HYPERTENSION: ICD-10-CM

## 2022-11-21 DIAGNOSIS — M79.89 OTHER SPECIFIED SOFT TISSUE DISORDERS: ICD-10-CM

## 2022-11-21 RX ORDER — HYDROCHLOROTHIAZIDE 25 MG/1
TABLET ORAL
Qty: 90 TABLET | Refills: 1 | OUTPATIENT
Start: 2022-11-21

## 2022-11-21 RX ORDER — ATORVASTATIN CALCIUM 40 MG/1
TABLET, FILM COATED ORAL
Qty: 90 TABLET | Refills: 1 | OUTPATIENT
Start: 2022-11-21

## 2023-07-31 NOTE — ED NOTES
I have reviewed discharge instructions with the patient. The patient verbalized understanding. Patient left ED via Discharge Method: ambulatory to Home with (Self). Opportunity for questions and clarification provided. Patient given 1 scripts. To continue your aftercare when you leave the hospital, you may receive an automated call from our care team to check in on how you are doing. This is a free service and part of our promise to provide the best care and service to meet your aftercare needs.  If you have questions, or wish to unsubscribe from this service please call [phone]. Thank you for Choosing our [facility] Emergency Department.       Tawana Bangura RN  10/12/22 1616 Metronidazole Pregnancy And Lactation Text: This medication is Pregnancy Category B and considered safe during pregnancy.  It is also excreted in breast milk.

## 2024-02-28 ENCOUNTER — TRANSCRIBE ORDERS (OUTPATIENT)
Dept: SCHEDULING | Age: 59
End: 2024-02-28

## 2024-02-28 DIAGNOSIS — Z12.31 SCREENING MAMMOGRAM FOR HIGH-RISK PATIENT: Primary | ICD-10-CM

## 2024-03-12 ENCOUNTER — HOSPITAL ENCOUNTER (EMERGENCY)
Age: 59
Discharge: HOME | End: 2024-03-12
Attending: EMERGENCY MEDICINE

## 2024-03-12 VITALS
TEMPERATURE: 98.2 F | DIASTOLIC BLOOD PRESSURE: 72 MMHG | SYSTOLIC BLOOD PRESSURE: 128 MMHG | OXYGEN SATURATION: 100 % | BODY MASS INDEX: 31.24 KG/M2 | WEIGHT: 183 LBS | RESPIRATION RATE: 16 BRPM | HEART RATE: 77 BPM | HEIGHT: 64 IN

## 2024-03-12 DIAGNOSIS — R20.8 BURNING SENSATION: Primary | ICD-10-CM

## 2024-03-12 LAB
ALBUMIN SERPL-MCNC: 3.7 G/DL (ref 3.5–5)
ALBUMIN/GLOB SERPL: 0.8 (ref 0.4–1.6)
ALP SERPL-CCNC: 130 U/L (ref 50–136)
ALT SERPL-CCNC: 13 U/L (ref 12–65)
ANION GAP SERPL CALC-SCNC: 5 MMOL/L (ref 2–11)
APPEARANCE UR: CLEAR
AST SERPL-CCNC: 12 U/L (ref 15–37)
BACTERIA URNS QL MICRO: ABNORMAL /HPF
BASOPHILS # BLD: 0.1 K/UL (ref 0–0.2)
BASOPHILS NFR BLD: 1 % (ref 0–2)
BILIRUB SERPL-MCNC: 0.3 MG/DL (ref 0.2–1.1)
BILIRUB UR QL: NEGATIVE
BUN SERPL-MCNC: 11 MG/DL (ref 6–23)
CALCIUM SERPL-MCNC: 9.4 MG/DL (ref 8.3–10.4)
CASTS URNS QL MICRO: ABNORMAL /LPF
CHLORIDE SERPL-SCNC: 109 MMOL/L (ref 103–113)
CO2 SERPL-SCNC: 28 MMOL/L (ref 21–32)
COLOR UR: ABNORMAL
CREAT SERPL-MCNC: 0.9 MG/DL (ref 0.6–1)
CRYSTALS URNS QL MICRO: ABNORMAL /LPF
DIFFERENTIAL METHOD BLD: ABNORMAL
EOSINOPHIL # BLD: 0.1 K/UL (ref 0–0.8)
EOSINOPHIL NFR BLD: 1 % (ref 0.5–7.8)
EPI CELLS #/AREA URNS HPF: ABNORMAL /HPF
ERYTHROCYTE [DISTWIDTH] IN BLOOD BY AUTOMATED COUNT: 13.6 % (ref 11.9–14.6)
FLUAV RNA SPEC QL NAA+PROBE: NOT DETECTED
FLUBV RNA SPEC QL NAA+PROBE: NOT DETECTED
GLOBULIN SER CALC-MCNC: 4.5 G/DL (ref 2.8–4.5)
GLUCOSE SERPL-MCNC: 89 MG/DL (ref 65–100)
GLUCOSE UR STRIP.AUTO-MCNC: NEGATIVE MG/DL
HCT VFR BLD AUTO: 39.3 % (ref 35.8–46.3)
HGB BLD-MCNC: 12 G/DL (ref 11.7–15.4)
HGB UR QL STRIP: NEGATIVE
IMM GRANULOCYTES # BLD AUTO: 0 K/UL (ref 0–0.5)
IMM GRANULOCYTES NFR BLD AUTO: 0 % (ref 0–5)
KETONES UR QL STRIP.AUTO: ABNORMAL MG/DL
LEUKOCYTE ESTERASE UR QL STRIP.AUTO: ABNORMAL
LYMPHOCYTES # BLD: 3.7 K/UL (ref 0.5–4.6)
LYMPHOCYTES NFR BLD: 42 % (ref 13–44)
MCH RBC QN AUTO: 29.3 PG (ref 26.1–32.9)
MCHC RBC AUTO-ENTMCNC: 30.5 G/DL (ref 31.4–35)
MCV RBC AUTO: 95.9 FL (ref 82–102)
MONOCYTES # BLD: 0.6 K/UL (ref 0.1–1.3)
MONOCYTES NFR BLD: 7 % (ref 4–12)
MUCOUS THREADS URNS QL MICRO: 0 /LPF
NEUTS SEG # BLD: 4.3 K/UL (ref 1.7–8.2)
NEUTS SEG NFR BLD: 49 % (ref 43–78)
NITRITE UR QL STRIP.AUTO: NEGATIVE
NRBC # BLD: 0 K/UL (ref 0–0.2)
PH UR STRIP: 5.5 (ref 5–9)
PLATELET # BLD AUTO: 344 K/UL (ref 150–450)
PMV BLD AUTO: 10.2 FL (ref 9.4–12.3)
POTASSIUM SERPL-SCNC: 3.6 MMOL/L (ref 3.5–5.1)
PROT SERPL-MCNC: 8.2 G/DL (ref 6.3–8.2)
PROT UR STRIP-MCNC: ABNORMAL MG/DL
RBC # BLD AUTO: 4.1 M/UL (ref 4.05–5.2)
RBC #/AREA URNS HPF: ABNORMAL /HPF
SARS-COV-2 RDRP RESP QL NAA+PROBE: NOT DETECTED
SODIUM SERPL-SCNC: 142 MMOL/L (ref 136–146)
SOURCE: NORMAL
SP GR UR REFRACTOMETRY: 1.03 (ref 1–1.02)
UROBILINOGEN UR QL STRIP.AUTO: 1 EU/DL (ref 0.2–1)
WBC # BLD AUTO: 8.7 K/UL (ref 4.3–11.1)
WBC URNS QL MICRO: ABNORMAL /HPF

## 2024-03-12 PROCEDURE — 87502 INFLUENZA DNA AMP PROBE: CPT

## 2024-03-12 PROCEDURE — 85025 COMPLETE CBC W/AUTO DIFF WBC: CPT

## 2024-03-12 PROCEDURE — 87635 SARS-COV-2 COVID-19 AMP PRB: CPT

## 2024-03-12 PROCEDURE — 80053 COMPREHEN METABOLIC PANEL: CPT

## 2024-03-12 PROCEDURE — 99283 EMERGENCY DEPT VISIT LOW MDM: CPT

## 2024-03-12 PROCEDURE — 81001 URINALYSIS AUTO W/SCOPE: CPT

## 2024-03-12 PROCEDURE — 81015 MICROSCOPIC EXAM OF URINE: CPT

## 2024-03-12 ASSESSMENT — PAIN - FUNCTIONAL ASSESSMENT: PAIN_FUNCTIONAL_ASSESSMENT: NONE - DENIES PAIN

## 2024-03-12 ASSESSMENT — LIFESTYLE VARIABLES
HOW MANY STANDARD DRINKS CONTAINING ALCOHOL DO YOU HAVE ON A TYPICAL DAY: PATIENT DECLINED
HOW OFTEN DO YOU HAVE A DRINK CONTAINING ALCOHOL: PATIENT DECLINED

## 2024-03-12 NOTE — ED PROVIDER NOTES
Emergency Department Provider Note       PCP: None, None   Age: 58 y.o.   Sex: female     DISPOSITION Decision To Discharge 03/12/2024 05:32:16 AM       ICD-10-CM    1. Burning sensation  R20.8           Medical Decision Making     Patient comes to the ED for evaluation of a \"burning\" sensation that began today.  Patient states this feeling is from her head to her toes.  Patient also reports nausea that has been ongoing for months.  Patient seen at outlying ED's 3 times last month.  ED notes reviewed.  Patient with multiple complaints, recently diagnosed with a UTI, states she finished her course of antibiotics.  Patient eating potato chips in the lobby of the emergency department.  Vital signs are stable.  She is afebrile without any respiratory distress.  She denies any URI complaints.  No active vomiting present.  Baseline labs obtained, influenza and COVID-negative.  CBC and CMP unremarkable.  Urinalysis without UTI.  Will discharge patient home with referral to establish a primary care physician.  She is stable for discharge at this time.  Strict return precautions discussed     1 acute complicated illness or injury.    Over the counter drug management performed.  Shared medical decision making was utilized in creating the patients health plan today.    I independently ordered and reviewed each unique test.    I reviewed external records: ED visit note from an outside group.         History     Patient comes to the ED for evaluation of a \"burning\" sensation that began today.  Patient states this feeling is from her head to her toes.    The history is provided by the patient and medical records. No  was used.     Physical Exam     Vitals signs and nursing note reviewed:  Vitals:    03/12/24 0241   BP: 126/79   Pulse: 86   Resp: 16   Temp: 98.6 °F (37 °C)   TempSrc: Oral   SpO2: 100%   Weight: 83 kg (183 lb)   Height: 1.626 m (5' 4\")      Physical Exam  Constitutional:       General: She is  130

## 2024-03-12 NOTE — ED TRIAGE NOTES
C/o nausea intermittently for 2 months, also has \"burning feeling\" that starts in her head and goes through her body, also states she has been having spells where she goes into a \"trance\" has been seen in ER for that but nothing was done.    Also has some hoarseness    Also has a HA today

## 2024-03-12 NOTE — DISCHARGE INSTRUCTIONS
If you have difficulty breathing, develop chest pain, pass out, or if you have any other concerning symptoms, please return to the ER immediately.

## 2024-03-12 NOTE — ED NOTES
I have reviewed discharge instructions with the patient.  The patient verbalized understanding.    Patient left ED via Discharge Method: ambulatory to Home with self.    Opportunity for questions and clarification provided.       Patient given 0 scripts.         To continue your aftercare when you leave the hospital, you may receive an automated call from our care team to check in on how you are doing.  This is a free service and part of our promise to provide the best care and service to meet your aftercare needs.” If you have questions, or wish to unsubscribe from this service please call [phone].  Thank you for Choosing our [facility] Emergency Department.        Jerald Orellana, RN  03/12/24 1885    
< from: TTE with Doppler (w/Cont) (10.28.22 @ 09:08) >    Dimensions:    Normal Values:  LA:     3.7    2.0 - 4.0 cm  Ao:     3.2    2.0 - 3.8 cm  SEPTUM: 1.3    0.6 - 1.2 cm  PWT:    0.8    0.6 -1.1 cm  LVIDd:  3.9    3.0 - 5.6 cm  LVIDs:  2.9    1.8 - 4.0 cm  Derived variables:  LVMI: 64 g/m2  RWT: 0.41  Fractional short: 26 %  EF (Modified Carcamo Rule): 63 %Doppler Peak Velocity  (m/sec): AoV=2.3  ------------------------------------------------------------------------  Observations:  Mitral Valve: Mitral annular calcification, otherwise  normal mitral valve. Minimal mitral regurgitation.  Aortic Valve/Aorta: Calcified trileaflet aortic valve with  decreased opening. Peak transaortic valve gradient equals  21 mm Hg, estimated aortic valve area equals 1.7 sqcm (by  continuity equation), consistent with mild aortic stenosis.  No aortic valve regurgitation seen. Peak left ventricular  outflow tract gradient equals 5 mm Hg, mean gradient is  equal to 3 mm Hg, LVOT velocity time integral equals 18 cm.  Aortic Root: 3.2 cm.  Left Atrium: Mildly dilated left atrium.  LA volume index =  37 cc/m2.  Left Ventricle: Endocardial visualization enhanced with  intravenous injection of Ultrasonic Enhancing Agent  (Definity).  Normal left ventricular systolic function. No  left ventricular thrombus. Normal left ventricular internal  dimensions and wall thicknesses. Mild diastolic dysfunction  (Stage I).  Right Heart: Normal right atrium. Normal right ventricular  size and function. Normal tricuspid valve. Minimal  tricuspid regurgitation. Normal pulmonic valve. No pulmonic  regurgitation.  Pericardium/Pleura: Normal pericardium with trace  pericardial effusion.  Hemodynamic: Estimated right atrial pressure is 8 mm Hg.  Color Doppler demonstrates no evidence of a patent foramen  ovale.  ------------------------------------------------------------------------  Conclusions:  1. Mitral annular calcification, otherwise normal mitral  valve. Minimal mitral regurgitation.  2. Calcified trileaflet aortic valve with decreased  opening. No aortic valve regurgitation seen.  3. Endocardial visualization enhanced with intravenous  injection of Ultrasonic Enhancing Agent (Definity).  Normal  left ventricular systolic function. No left ventricular  thrombus.  4. Mild diastolic dysfunction (Stage I).  5. Normal right ventricular size and function.  6. Normal pericardium with trace pericardial effusion.  *** Compared with echocardiogram of 10/25/2022, no  significant changes noted.  ------------------------------------------------------------------------  Confirmed on  10/28/2022 - 11:52:08 by Acosta Carty M.D.  ------------------------------------------------------------------------    < end of copied text >